# Patient Record
Sex: FEMALE | Race: BLACK OR AFRICAN AMERICAN | NOT HISPANIC OR LATINO | Employment: FULL TIME | URBAN - METROPOLITAN AREA
[De-identification: names, ages, dates, MRNs, and addresses within clinical notes are randomized per-mention and may not be internally consistent; named-entity substitution may affect disease eponyms.]

---

## 2017-06-16 ENCOUNTER — HOSPITAL ENCOUNTER (EMERGENCY)
Facility: HOSPITAL | Age: 22
Discharge: HOME | End: 2017-06-16
Attending: EMERGENCY MEDICINE
Payer: COMMERCIAL

## 2017-06-16 VITALS
HEART RATE: 83 BPM | WEIGHT: 200 LBS | TEMPERATURE: 99 F | HEIGHT: 65 IN | DIASTOLIC BLOOD PRESSURE: 70 MMHG | OXYGEN SATURATION: 100 % | RESPIRATION RATE: 18 BRPM | SYSTOLIC BLOOD PRESSURE: 117 MMHG | BODY MASS INDEX: 33.32 KG/M2

## 2017-06-16 DIAGNOSIS — N30.00 ACUTE CYSTITIS WITHOUT HEMATURIA: Primary | ICD-10-CM

## 2017-06-16 LAB
B-HCG UR QL: NEGATIVE
BACTERIA #/AREA URNS HPF: ABNORMAL /HPF
BILIRUB UR QL STRIP: NEGATIVE
CLARITY UR: ABNORMAL
COLOR UR: ABNORMAL
CTP QC/QA: YES
GLUCOSE UR QL STRIP: NEGATIVE
HGB UR QL STRIP: ABNORMAL
HYALINE CASTS #/AREA URNS LPF: 0 /LPF
KETONES UR QL STRIP: NEGATIVE
LEUKOCYTE ESTERASE UR QL STRIP: ABNORMAL
MICROSCOPIC COMMENT: ABNORMAL
NITRITE UR QL STRIP: NEGATIVE
PH UR STRIP: 6 [PH] (ref 5–8)
PROT UR QL STRIP: ABNORMAL
RBC #/AREA URNS HPF: 20 /HPF (ref 0–4)
SP GR UR STRIP: 1.01 (ref 1–1.03)
URN SPEC COLLECT METH UR: ABNORMAL
UROBILINOGEN UR STRIP-ACNC: ABNORMAL EU/DL
WBC #/AREA URNS HPF: >100 /HPF (ref 0–5)

## 2017-06-16 PROCEDURE — 87077 CULTURE AEROBIC IDENTIFY: CPT

## 2017-06-16 PROCEDURE — 99283 EMERGENCY DEPT VISIT LOW MDM: CPT | Mod: 25

## 2017-06-16 PROCEDURE — 87088 URINE BACTERIA CULTURE: CPT

## 2017-06-16 PROCEDURE — 81000 URINALYSIS NONAUTO W/SCOPE: CPT

## 2017-06-16 PROCEDURE — 87186 SC STD MICRODIL/AGAR DIL: CPT

## 2017-06-16 PROCEDURE — 87086 URINE CULTURE/COLONY COUNT: CPT

## 2017-06-16 PROCEDURE — 81025 URINE PREGNANCY TEST: CPT | Performed by: EMERGENCY MEDICINE

## 2017-06-16 RX ORDER — PHENAZOPYRIDINE HYDROCHLORIDE 200 MG/1
200 TABLET, FILM COATED ORAL 3 TIMES DAILY
Qty: 6 TABLET | Refills: 0 | Status: SHIPPED | OUTPATIENT
Start: 2017-06-16 | End: 2017-06-26

## 2017-06-16 RX ORDER — NITROFURANTOIN 25; 75 MG/1; MG/1
100 CAPSULE ORAL 2 TIMES DAILY
Qty: 14 CAPSULE | Refills: 0 | Status: SHIPPED | OUTPATIENT
Start: 2017-06-16 | End: 2017-06-23

## 2017-06-17 NOTE — ED PROVIDER NOTES
Encounter Date: 6/16/2017       History     Chief Complaint   Patient presents with    Generalized Body Aches     multiple complaints. HA, body aches, lower abdominal pain, dysuria(taking AZO) that began monday. having irregular periods since January     Review of patient's allergies indicates:  No Known Allergies  Ekaterina Olsen, a 22 y.o. female, complains of dysuria last week at the time of her menses.  She took Azo for a few days and that seemed to help.  She said her menstrual cycle has been irregular since January but does not believe she is pregnant.  She denies any vaginal discharge.  She complains of generalized body aches but denies any documented fever.  Pain location: Generalized body aches and suprapubic pressure  Pain Severity: Mild-to-moderate    Pain timing: One week  Pain character: Achy, suprapubic pressure    Associated with or Modified by: (see above)              History reviewed. No pertinent past medical history.  History reviewed. No pertinent surgical history.  History reviewed. No pertinent family history.  Social History   Substance Use Topics    Smoking status: Never Smoker    Smokeless tobacco: Not on file    Alcohol use No     Review of Systems   Constitutional: Positive for fatigue.   HENT: Negative.    Respiratory: Negative.    Gastrointestinal: Negative.    Genitourinary: Positive for difficulty urinating.   All other systems reviewed and are negative.      Physical Exam     Initial Vitals [06/16/17 1803]   BP Pulse Resp Temp SpO2   126/80 102 18 99.8 °F (37.7 °C) 97 %     Physical Exam    Nursing note and vitals reviewed.  Constitutional: She appears well-developed and well-nourished.   HENT:   Right Ear: External ear normal.   Left Ear: External ear normal.   Mouth/Throat: Oropharynx is clear and moist. No oropharyngeal exudate.   Eyes: Conjunctivae and EOM are normal. Pupils are equal, round, and reactive to light.   Neck: Normal range of motion. Neck supple.   Cardiovascular:  Normal rate, regular rhythm and normal heart sounds.   Pulmonary/Chest: Breath sounds normal.   Abdominal: There is no tenderness.   Musculoskeletal: Normal range of motion.   Neurological: She is alert and oriented to person, place, and time. She has normal strength.   Skin: Skin is warm and dry.   Psychiatric: She has a normal mood and affect. Her behavior is normal. Thought content normal.         ED Course   Procedures  Labs Reviewed   URINALYSIS   POCT URINE PREGNANCY             Medical Decision Making:   Initial Assessment:   22 y.o. female, complains of dysuria last week at the time of her menses.  She took Azo for a few days and that seemed to help.  She said her menstrual cycle has been irregular since January but does not believe she is pregnant.  She denies any vaginal discharge.  She complains of generalized body aches but denies any documented fever.  Pain location: Generalized body aches and suprapubic pressure  PE: No acute distress; unremarkable physical exam  Differential Diagnosis:   Pyelonephritis, cystitis,  Clinical Tests:   Lab Tests: Ordered and Reviewed  ED Management:  The patient will be treated for acute cystitis with Macrobid and a silent follow-up with the primary care physician.                   ED Course     Clinical Impression:   The encounter diagnosis was Acute cystitis without hematuria.          Charles Urban Jr., MD  06/17/17 [id number]

## 2017-06-17 NOTE — ED NOTES
Pt c/o dysuria and diarrhea since Friday, body aches since Monday.  Pt states she has been taking aleve and AZO without relief.

## 2017-06-18 LAB — BACTERIA UR CULT: NORMAL

## 2017-06-19 NOTE — PROVIDER PROGRESS NOTES - EMERGENCY DEPT.
Encounter Date: 6/16/2017    ED Physician Progress Notes         06/18/2017 8:44 PM patient sent home with Macrobid; culture is sensitive to this medication.